# Patient Record
Sex: FEMALE | URBAN - METROPOLITAN AREA
[De-identification: names, ages, dates, MRNs, and addresses within clinical notes are randomized per-mention and may not be internally consistent; named-entity substitution may affect disease eponyms.]

---

## 2024-01-22 ENCOUNTER — TELEPHONE (OUTPATIENT)
Dept: OBSTETRICS AND GYNECOLOGY | Facility: CLINIC | Age: 24
End: 2024-01-22
Payer: COMMERCIAL

## 2024-01-22 NOTE — TELEPHONE ENCOUNTER
----- Message from Gemini Irvin sent at 1/19/2024 11:05 AM CST -----  Contact: Patient  Patient called to consult with nurse or staff regarding an appointment. She states she is 23 weeks and wanted to come in for OB appointment. She would like a call back regarding this and can be reached at [phone]. Thanks/MR